# Patient Record
Sex: FEMALE | Race: WHITE | NOT HISPANIC OR LATINO | URBAN - METROPOLITAN AREA
[De-identification: names, ages, dates, MRNs, and addresses within clinical notes are randomized per-mention and may not be internally consistent; named-entity substitution may affect disease eponyms.]

---

## 2020-07-24 ENCOUNTER — OFFICE VISIT (OUTPATIENT)
Dept: URBAN - METROPOLITAN AREA TELEHEALTH 2 | Facility: TELEHEALTH | Age: 45
End: 2020-07-24
Payer: COMMERCIAL

## 2020-07-24 DIAGNOSIS — K31.84 GASTROPARESIS: ICD-10-CM

## 2020-07-24 DIAGNOSIS — Z12.11 SCREENING FOR COLON CANCER: ICD-10-CM

## 2020-07-24 DIAGNOSIS — K76.0 FATTY LIVER: ICD-10-CM

## 2020-07-24 DIAGNOSIS — R10.11 RIGHT UPPER QUADRANT ABDOMINAL PAIN: ICD-10-CM

## 2020-07-24 PROCEDURE — 1036F TOBACCO NON-USER: CPT | Performed by: NURSE PRACTITIONER

## 2020-07-24 PROCEDURE — G8417 CALC BMI ABV UP PARAM F/U: HCPCS | Performed by: NURSE PRACTITIONER

## 2020-07-24 PROCEDURE — G9903 PT SCRN TBCO ID AS NON USER: HCPCS | Performed by: NURSE PRACTITIONER

## 2020-07-24 PROCEDURE — G8427 DOCREV CUR MEDS BY ELIG CLIN: HCPCS | Performed by: NURSE PRACTITIONER

## 2020-07-24 PROCEDURE — 99213 OFFICE O/P EST LOW 20 MIN: CPT | Performed by: NURSE PRACTITIONER

## 2020-07-24 RX ORDER — PANTOPRAZOLE SODIUM 40 MG/1
TAKE 1 TABLET BY MOUTH TWICE A DAY TABLET, DELAYED RELEASE ORAL
Qty: 180 | Refills: 3 | Status: ACTIVE | COMMUNITY
Start: 2019-09-27

## 2020-07-24 RX ORDER — LABETALOL HYDROCHLORIDE 100 MG/1
1 TABLET TABLET ORAL TWICE A DAY
Status: ACTIVE | COMMUNITY

## 2020-07-24 RX ORDER — METOCLOPRAMIDE 10 MG/1
TAKE 2 TABLETS BY MOUTH AT BEDTIME TABLET ORAL
Qty: 60 | Refills: 5 | Status: ACTIVE | COMMUNITY
Start: 2020-03-02

## 2020-07-24 RX ORDER — METFORMIN HYDROCHLORIDE 500 MG/1
3 TABLETS IN AM AND 2 TABLETS AT BEDTIME TABLET, FILM COATED ORAL
Status: ACTIVE | COMMUNITY

## 2020-07-24 NOTE — HPI-OTHER HISTORIES
7/24/20 Diana presents for follow up for gastroparesis and fatty liver. She is working hard on weight loss with diet and exercise and has lost 20# since January. She is having a breakfast smoothie in the mornings and a [employer] latte shake for lunch each day. She eats a typical dinner but smaller portion around 5PM. She is walking 4 miles 5-6 days a week. She is feeling great. She continues to take pantoprazole twice daily and Reglan 10mg two tablets at bedtime but has not needed EES for a flare since January. She is on Metformin for PCOS and PCP is keeping close eye on blood sugar. Recent HA1C was 5.5 with fasting glucose of 93. She is looking forward to getting back to work next week but a little anxious as they are going back inperson with limited precautions other than social distancing when possible. 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  She had colonoscopy  2/17/20 with mild sigmoid diverticulosis, otherwise normal. Due for repeat in 10 years. TG   1/20/2020 Patient presents for follow up. Overall, she is doing well. Gastroparesis is well controlled on Reglan. She did take EES x 1 weeks between Thanksgiving and Christmas but o/w has not needed it. She rarely has a sharp pain in the RUQ pain. This comes and goes in a matter of seconds. No nausea/vomiting. She is working on weight loss and is down 8# since 12/30/2019--3# per our records since her last visit in July. Stress is much better this school year since switching to [employer] department.  She is ready to go and ahead and schedule screening colonoscopy. No f/h of colon cancer or colon polyps. She has regular bowel movements, no diarrhea or constipation. No abdominal pain. No rectal bleeding. No anemia. No prior colonoscopy. TG

## 2020-07-28 LAB
ALBUMIN: 4.7
ALKALINE PHOSPHATASE: 51
ALT (SGPT): 19
AST (SGOT): 18
BILIRUBIN, DIRECT: 0.11
BILIRUBIN, TOTAL: 0.4
PROTEIN, TOTAL: 6.9

## 2020-08-24 ENCOUNTER — ERX REFILL RESPONSE (OUTPATIENT)
Age: 45
End: 2020-08-24

## 2020-08-24 RX ORDER — METOCLOPRAMIDE 10 MG/1
TAKE 2 TABLETS BY MOUTH AT BEDTIME TABLET ORAL
Qty: 60 TABLETS | Refills: 5

## 2020-09-14 ENCOUNTER — ERX REFILL RESPONSE (OUTPATIENT)
Age: 45
End: 2020-09-14

## 2020-09-14 RX ORDER — PANTOPRAZOLE SODIUM 40 MG/1
TAKE 1 TABLET BY MOUTH TWICE A DAY TABLET, DELAYED RELEASE ORAL
Qty: 180 TABLETS | Refills: 3

## 2021-02-17 ENCOUNTER — ERX REFILL RESPONSE (OUTPATIENT)
Age: 46
End: 2021-02-17

## 2021-02-17 RX ORDER — METOCLOPRAMIDE 10 MG/1
TAKE 2 TABLETS BY MOUTH AT BEDTIME TABLET ORAL
Qty: 60 TABLETS | Refills: 5

## 2021-08-31 ENCOUNTER — TELEPHONE ENCOUNTER (OUTPATIENT)
Dept: URBAN - METROPOLITAN AREA CLINIC 92 | Facility: CLINIC | Age: 46
End: 2021-08-31

## 2021-08-31 ENCOUNTER — ERX REFILL RESPONSE (OUTPATIENT)
Dept: URBAN - NONMETROPOLITAN AREA CLINIC 2 | Facility: CLINIC | Age: 46
End: 2021-08-31

## 2021-08-31 RX ORDER — METOCLOPRAMIDE 10 MG/1
TAKE 2 TABLETS BY MOUTH AT BEDTIME 30 DAYS TABLET ORAL
Qty: 60 TABLET | Refills: 1 | OUTPATIENT

## 2021-08-31 RX ORDER — METOCLOPRAMIDE 10 MG/1
TAKE 2 TABLETS BY MOUTH AT BEDTIME TABLET ORAL
Qty: 60 TABLETS | Refills: 5 | OUTPATIENT

## 2021-09-13 ENCOUNTER — ERX REFILL RESPONSE (OUTPATIENT)
Dept: URBAN - NONMETROPOLITAN AREA CLINIC 2 | Facility: CLINIC | Age: 46
End: 2021-09-13

## 2021-09-13 RX ORDER — PANTOPRAZOLE SODIUM 40 MG/1
TAKE 1 TABLET BY MOUTH TWICE A DAY TABLET, DELAYED RELEASE ORAL
Qty: 180 TABLETS | Refills: 1 | OUTPATIENT

## 2021-09-13 RX ORDER — PANTOPRAZOLE SODIUM 40 MG/1
TAKE 1 TABLET BY MOUTH TWICE A DAY TABLET, DELAYED RELEASE ORAL
Qty: 180 TABLETS | Refills: 3 | OUTPATIENT

## 2021-09-24 ENCOUNTER — ERX REFILL RESPONSE (OUTPATIENT)
Dept: URBAN - NONMETROPOLITAN AREA CLINIC 2 | Facility: CLINIC | Age: 46
End: 2021-09-24

## 2021-09-24 RX ORDER — METOCLOPRAMIDE 10 MG/1
TAKE 2 TABLETS BY MOUTH AT BEDTIME 30 DAYS TABLET ORAL
Qty: 60 TABLET | Refills: 1 | OUTPATIENT

## 2021-10-11 ENCOUNTER — LAB OUTSIDE AN ENCOUNTER (OUTPATIENT)
Dept: URBAN - NONMETROPOLITAN AREA CLINIC 2 | Facility: CLINIC | Age: 46
End: 2021-10-11

## 2021-10-11 ENCOUNTER — OFFICE VISIT (OUTPATIENT)
Dept: URBAN - NONMETROPOLITAN AREA CLINIC 2 | Facility: CLINIC | Age: 46
End: 2021-10-11
Payer: COMMERCIAL

## 2021-10-11 ENCOUNTER — WEB ENCOUNTER (OUTPATIENT)
Dept: URBAN - NONMETROPOLITAN AREA CLINIC 2 | Facility: CLINIC | Age: 46
End: 2021-10-11

## 2021-10-11 DIAGNOSIS — Z12.11 SCREENING FOR COLON CANCER: ICD-10-CM

## 2021-10-11 DIAGNOSIS — K76.0 FATTY LIVER: ICD-10-CM

## 2021-10-11 DIAGNOSIS — R10.11 RIGHT UPPER QUADRANT ABDOMINAL PAIN: ICD-10-CM

## 2021-10-11 DIAGNOSIS — K31.84 GASTROPARESIS: ICD-10-CM

## 2021-10-11 PROCEDURE — 99214 OFFICE O/P EST MOD 30 MIN: CPT | Performed by: NURSE PRACTITIONER

## 2021-10-11 RX ORDER — PANTOPRAZOLE SODIUM 40 MG/1
TAKE 1 TABLET BY MOUTH TWICE A DAY TABLET, DELAYED RELEASE ORAL BID
Qty: 180 TABLET | Refills: 3

## 2021-10-11 RX ORDER — LABETALOL HYDROCHLORIDE 100 MG/1
1 TABLET TABLET ORAL TWICE A DAY
Status: ACTIVE | COMMUNITY

## 2021-10-11 RX ORDER — PANTOPRAZOLE SODIUM 40 MG/1
TAKE 1 TABLET BY MOUTH TWICE A DAY TABLET, DELAYED RELEASE ORAL
Qty: 180 TABLETS | Refills: 1 | Status: ACTIVE | COMMUNITY

## 2021-10-11 RX ORDER — ERYTHROMYCIN 500 MG/1
TWICE DAILY AS NEEDED TABLET, FILM COATED ORAL BID
Status: ACTIVE | COMMUNITY

## 2021-10-11 RX ORDER — METOCLOPRAMIDE 10 MG/1
TAKE 2 TABLETS BY MOUTH AT BEDTIME 30 DAYS TABLET ORAL
Qty: 60 TABLET | Refills: 1 | Status: ACTIVE | COMMUNITY

## 2021-10-11 RX ORDER — METFORMIN HYDROCHLORIDE 500 MG/1
3 TABLETS IN AM AND 2 TABLETS AT BEDTIME TABLET, FILM COATED ORAL
Status: ACTIVE | COMMUNITY

## 2021-10-11 RX ORDER — METOCLOPRAMIDE 10 MG/1
TAKE 2 TABLETS BY MOUTH AT BEDTIME TABLET ORAL ONCE DAILY
Qty: 180 TABLETS | Refills: 3

## 2021-10-11 RX ORDER — ERYTHROMYCIN 500 MG/1
TWICE DAILY AS NEEDED TABLET, FILM COATED ORAL BID
Qty: 180 TABLETS | Refills: 1

## 2021-10-11 NOTE — HPI-OTHER HISTORIES
7/24/20 Diana presents for follow up for gastroparesis and fatty liver. She is working hard on weight loss with diet and exercise and has lost 20# since January. She is having a breakfast smoothie in the mornings and a [employer] latte shake for lunch each day. She eats a typical dinner but smaller portion around 5PM. She is walking 4 miles 5-6 days a week. She is feeling great. She continues to take pantoprazole twice daily and Reglan 10mg two tablets at bedtime but has not needed EES for a flare since January. She is on Metformin for PCOS and PCP is keeping close eye on blood sugar. Recent HA1C was 5.5 with fasting glucose of 93. She is looking forward to getting back to work next week but a little anxious as they are going back inperson with limited precautions other than social distancing when possible. 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  She had colonoscopy  2/17/20 with mild sigmoid diverticulosis, otherwise normal. Due for repeat in 10 years. TG   1/20/2020 Patient presents for follow up. Overall, she is doing well. Gastroparesis is well controlled on Reglan. She did take EES x 1 weeks between Thanksgiving and Christmas but o/w has not needed it. She rarely has a sharp pain in the RUQ pain. This comes and goes in a matter of seconds. No nausea/vomiting. She is working on weight loss and is down 8# since 12/30/2019--3# per our records since her last visit in July. Stress is much better this school year since switching to [employer] department.  She is ready to go and ahead and schedule screening colonoscopy. No f/h of colon cancer or colon polyps. She has regular bowel movements, no diarrhea or constipation. No abdominal pain. No rectal bleeding. No anemia. No prior colonoscopy. TG  10/11/21 Diana presents for follow up for gastroparesis. She needs refills on Reglan 10mg two at bedtime, pantoprazole 40mg bid, and EES 500mg bid that she uses only during a flare. She ran out of pantoprazole about a week ago and is having daily heartburn. She is doing well on a diet of liquid meals twice daily with one regular meal in the early evening. She is currently struggling with nausea/flare after eating apple chips and laying down a few days ago. Her weight is stable.  She has had a difficult year and with COVID November 2020 and lost 20# with this. She was admitted for a week with pneumonia and was very slow to get back to baseline. Her mother, Dary Freire, also passed away earlier this year after having CHF following COVID.  TG

## 2021-10-12 LAB
A/G RATIO: 2.4
ALBUMIN: 5
ALKALINE PHOSPHATASE: 56
ALT (SGPT): 21
AST (SGOT): 25
BILIRUBIN, TOTAL: 0.5
BUN/CREATININE RATIO: 22
BUN: 19
CALCIUM: 9.7
CARBON DIOXIDE, TOTAL: 25
CHLORIDE: 98
CREATININE: 0.86
EGFR IF AFRICN AM: 94
EGFR IF NONAFRICN AM: 81
GLOBULIN, TOTAL: 2.1
GLUCOSE: 82
HEMATOCRIT: 39.5
HEMOGLOBIN: 12.8
INR: 1
MCH: 27.4
MCHC: 32.4
MCV: 84
NRBC: (no result)
PLATELETS: 229
POTASSIUM: 4.6
PROTEIN, TOTAL: 7.1
PROTHROMBIN TIME: 10.4
RBC: 4.68
RDW: 14.4
SODIUM: 138
WBC: 7.2

## 2022-04-11 ENCOUNTER — OFFICE VISIT (OUTPATIENT)
Dept: URBAN - NONMETROPOLITAN AREA CLINIC 13 | Facility: CLINIC | Age: 47
End: 2022-04-11
Payer: COMMERCIAL

## 2022-04-11 VITALS
DIASTOLIC BLOOD PRESSURE: 83 MMHG | WEIGHT: 273.2 LBS | HEART RATE: 75 BPM | HEIGHT: 64 IN | SYSTOLIC BLOOD PRESSURE: 127 MMHG | BODY MASS INDEX: 46.64 KG/M2 | TEMPERATURE: 97.9 F

## 2022-04-11 DIAGNOSIS — K76.0 FATTY LIVER: ICD-10-CM

## 2022-04-11 DIAGNOSIS — R10.11 RIGHT UPPER QUADRANT ABDOMINAL PAIN: ICD-10-CM

## 2022-04-11 DIAGNOSIS — K31.84 GASTROPARESIS: ICD-10-CM

## 2022-04-11 DIAGNOSIS — Z12.11 SCREENING FOR COLON CANCER: ICD-10-CM

## 2022-04-11 PROCEDURE — 99214 OFFICE O/P EST MOD 30 MIN: CPT | Performed by: NURSE PRACTITIONER

## 2022-04-11 RX ORDER — ERYTHROMYCIN 500 MG/1
TWICE DAILY AS NEEDED TABLET, FILM COATED ORAL BID
Qty: 180 TABLETS | Refills: 3

## 2022-04-11 RX ORDER — LABETALOL HYDROCHLORIDE 100 MG/1
1 TABLET TABLET ORAL TWICE A DAY
Status: ACTIVE | COMMUNITY

## 2022-04-11 RX ORDER — PANTOPRAZOLE SODIUM 40 MG/1
TAKE 1 TABLET BY MOUTH TWICE A DAY TABLET, DELAYED RELEASE ORAL BID
Qty: 180 TABLET | Refills: 3 | Status: ACTIVE | COMMUNITY

## 2022-04-11 RX ORDER — ERYTHROMYCIN 500 MG/1
TWICE DAILY AS NEEDED TABLET, FILM COATED ORAL BID
Qty: 180 TABLETS | Refills: 1 | Status: ACTIVE | COMMUNITY

## 2022-04-11 RX ORDER — PANTOPRAZOLE SODIUM 40 MG/1
TAKE 1 TABLET BY MOUTH TWICE A DAY TABLET, DELAYED RELEASE ORAL BID
Qty: 180 TABLET | Refills: 3

## 2022-04-11 RX ORDER — METOCLOPRAMIDE 10 MG/1
TAKE 2 TABLETS BY MOUTH AT BEDTIME TABLET ORAL ONCE DAILY
Qty: 180 TABLETS | Refills: 3

## 2022-04-11 RX ORDER — METFORMIN HYDROCHLORIDE 500 MG/1
3 TABLETS IN AM AND 2 TABLETS AT BEDTIME TABLET, FILM COATED ORAL
Status: ACTIVE | COMMUNITY

## 2022-04-11 RX ORDER — METOCLOPRAMIDE 10 MG/1
TAKE 2 TABLETS BY MOUTH AT BEDTIME TABLET ORAL ONCE DAILY
Qty: 180 TABLETS | Refills: 3 | Status: ACTIVE | COMMUNITY

## 2022-04-11 NOTE — HPI-OTHER HISTORIES
7/24/20 Diana presents for follow up for gastroparesis and fatty liver. She is working hard on weight loss with diet and exercise and has lost 20# since January. She is having a breakfast smoothie in the mornings and a [employer] latte shake for lunch each day. She eats a typical dinner but smaller portion around 5PM. She is walking 4 miles 5-6 days a week. She is feeling great. She continues to take pantoprazole twice daily and Reglan 10mg two tablets at bedtime but has not needed EES for a flare since January. She is on Metformin for PCOS and PCP is keeping close eye on blood sugar. Recent HA1C was 5.5 with fasting glucose of 93. She is looking forward to getting back to work next week but a little anxious as they are going back inperson with limited precautions other than social distancing when possible. 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  She had colonoscopy  2/17/20 with mild sigmoid diverticulosis, otherwise normal. Due for repeat in 10 years. TG   1/20/2020 Patient presents for follow up. Overall, she is doing well. Gastroparesis is well controlled on Reglan. She did take EES x 1 weeks between Thanksgiving and Christmas but o/w has not needed it. She rarely has a sharp pain in the RUQ pain. This comes and goes in a matter of seconds. No nausea/vomiting. She is working on weight loss and is down 8# since 12/30/2019--3# per our records since her last visit in July. Stress is much better this school year since switching to [employer] department.  She is ready to go and ahead and schedule screening colonoscopy. No f/h of colon cancer or colon polyps. She has regular bowel movements, no diarrhea or constipation. No abdominal pain. No rectal bleeding. No anemia. No prior colonoscopy. TG  10/11/21 Diana presents for follow up for gastroparesis. She needs refills on Reglan 10mg two at bedtime, pantoprazole 40mg bid, and EES 500mg bid that she uses only during a flare. She ran out of pantoprazole about a week ago and is having daily heartburn. She is doing well on a diet of liquid meals twice daily with one regular meal in the early evening. She is currently struggling with nausea/flare after eating apple chips and laying down a few days ago. Her weight is stable.  She has had a difficult year and with COVID November 2020 and lost 20# with this. She was admitted for a week with pneumonia and was very slow to get back to baseline. Her mother, Dary Freire, also passed away earlier this year after having CHF following COVID.  TG  4/11/22 Diana presents for GP follow up. She continues to struggle with exacerbation about once in two months. She takes EES during flares and pantoprazole bid and reglan 20mg at bedtime scheduled. She cannot tolerate reglan during the day due to drowsiness. She has not been on EES scheduled throughout the day as she tries to limit medications. She is to the point of only tolerating one normal meal daily. She has liquids for breakfast and dinner. If she tries to eat solids twice daily she has a flare. She has gained about 10# since her last visit. She attributes this to stress eating, typically snacks like chocolate.  She has hx of fatty liver. US 1/2021 was normal with normal echotexture and LFTs were normal. She continues on vitamin E. TG

## 2022-10-03 ENCOUNTER — OFFICE VISIT (OUTPATIENT)
Dept: URBAN - NONMETROPOLITAN AREA CLINIC 13 | Facility: CLINIC | Age: 47
End: 2022-10-03
Payer: COMMERCIAL

## 2022-10-03 VITALS
SYSTOLIC BLOOD PRESSURE: 124 MMHG | WEIGHT: 267 LBS | HEIGHT: 64 IN | HEART RATE: 71 BPM | DIASTOLIC BLOOD PRESSURE: 85 MMHG | BODY MASS INDEX: 45.58 KG/M2

## 2022-10-03 DIAGNOSIS — Z12.11 SCREENING FOR COLON CANCER: ICD-10-CM

## 2022-10-03 DIAGNOSIS — R10.11 RIGHT UPPER QUADRANT ABDOMINAL PAIN: ICD-10-CM

## 2022-10-03 DIAGNOSIS — K31.84 GASTROPARESIS: ICD-10-CM

## 2022-10-03 DIAGNOSIS — K76.0 FATTY LIVER: ICD-10-CM

## 2022-10-03 PROCEDURE — 99213 OFFICE O/P EST LOW 20 MIN: CPT | Performed by: NURSE PRACTITIONER

## 2022-10-03 RX ORDER — PANTOPRAZOLE SODIUM 40 MG/1
TAKE 1 TABLET BY MOUTH TWICE A DAY TABLET, DELAYED RELEASE ORAL BID
Qty: 180 TABLET | Refills: 3

## 2022-10-03 RX ORDER — ONDANSETRON 4 MG/1
1 TABLET ON THE TONGUE AND ALLOW TO DISSOLVE TABLET, ORALLY DISINTEGRATING ORAL
Qty: 30 TABLET | Refills: 0 | OUTPATIENT
Start: 2022-10-03

## 2022-10-03 RX ORDER — PANTOPRAZOLE SODIUM 40 MG/1
TAKE 1 TABLET BY MOUTH TWICE A DAY TABLET, DELAYED RELEASE ORAL BID
Qty: 180 TABLET | Refills: 3 | Status: ACTIVE | COMMUNITY

## 2022-10-03 RX ORDER — METFORMIN HYDROCHLORIDE 500 MG/1
3 TABLETS IN AM AND 2 TABLETS AT BEDTIME TABLET, FILM COATED ORAL
Status: ACTIVE | COMMUNITY

## 2022-10-03 RX ORDER — ERYTHROMYCIN 500 MG/1
TWICE DAILY AS NEEDED TABLET, FILM COATED ORAL
Qty: 270 | Refills: 3

## 2022-10-03 RX ORDER — ERYTHROMYCIN 500 MG/1
TWICE DAILY AS NEEDED TABLET, FILM COATED ORAL BID
Qty: 180 TABLETS | Refills: 3 | Status: ACTIVE | COMMUNITY

## 2022-10-03 RX ORDER — METOCLOPRAMIDE 10 MG/1
TAKE 2 TABLETS BY MOUTH AT BEDTIME TABLET ORAL ONCE DAILY
Qty: 180 TABLETS | Refills: 3

## 2022-10-03 RX ORDER — LABETALOL HYDROCHLORIDE 100 MG/1
1 TABLET TABLET ORAL TWICE A DAY
Status: ACTIVE | COMMUNITY

## 2022-10-03 RX ORDER — METOCLOPRAMIDE 10 MG/1
TAKE 2 TABLETS BY MOUTH AT BEDTIME TABLET ORAL ONCE DAILY
Qty: 180 TABLETS | Refills: 3 | Status: ACTIVE | COMMUNITY

## 2022-10-03 NOTE — HPI-TODAY'S VISIT:
Patient is a 47-year-old female previously followed by Pearl Leal who returns for follow-up of gastroparesis.  She is currently doing okay.  She knows that if she eats too much, she will have a flare.  She is normally mindful of this and tries to supplement a meal or 2 a day with a protein shake or some sort of liquid supplement instead.  Reglan is helpful, but it makes her too fatigued.  She takes erythromycin twice daily.  No additional complaints.  Otherwise, she is doing well. Sb

## 2022-10-24 ENCOUNTER — TELEPHONE ENCOUNTER (OUTPATIENT)
Dept: URBAN - METROPOLITAN AREA CLINIC 92 | Facility: CLINIC | Age: 47
End: 2022-10-24

## 2022-10-24 RX ORDER — ERYTHROMYCIN 500 MG/1
FOUR TIMES DAILY AS NEEDED TABLET, FILM COATED ORAL
Qty: 360 | Refills: 3

## 2023-04-12 ENCOUNTER — OFFICE VISIT (OUTPATIENT)
Dept: URBAN - NONMETROPOLITAN AREA CLINIC 2 | Facility: CLINIC | Age: 48
End: 2023-04-12
Payer: COMMERCIAL

## 2023-04-12 VITALS
SYSTOLIC BLOOD PRESSURE: 125 MMHG | TEMPERATURE: 97.1 F | DIASTOLIC BLOOD PRESSURE: 89 MMHG | HEART RATE: 69 BPM | WEIGHT: 221 LBS | HEIGHT: 64 IN | BODY MASS INDEX: 37.73 KG/M2

## 2023-04-12 DIAGNOSIS — K31.84 GASTROPARESIS: ICD-10-CM

## 2023-04-12 PROCEDURE — 99213 OFFICE O/P EST LOW 20 MIN: CPT | Performed by: INTERNAL MEDICINE

## 2023-04-12 RX ORDER — ERYTHROMYCIN 500 MG/1
FOUR TIMES DAILY AS NEEDED TABLET, FILM COATED ORAL
Qty: 360 | Refills: 3

## 2023-04-12 RX ORDER — METOCLOPRAMIDE 10 MG/1
TAKE 2 TABLETS BY MOUTH AT BEDTIME TABLET ORAL ONCE DAILY
Qty: 180 TABLETS | Refills: 3 | Status: ACTIVE | COMMUNITY

## 2023-04-12 RX ORDER — PANTOPRAZOLE SODIUM 40 MG/1
TAKE 1 TABLET BY MOUTH TWICE A DAY TABLET, DELAYED RELEASE ORAL BID
Qty: 180 TABLET | Refills: 3 | Status: ACTIVE | COMMUNITY

## 2023-04-12 RX ORDER — METFORMIN HYDROCHLORIDE 500 MG/1
3 TABLETS IN AM AND 2 TABLETS AT BEDTIME TABLET, FILM COATED ORAL
Status: ACTIVE | COMMUNITY

## 2023-04-12 RX ORDER — LABETALOL HYDROCHLORIDE 100 MG/1
1 TABLET TABLET ORAL TWICE A DAY
Status: ACTIVE | COMMUNITY

## 2023-04-12 RX ORDER — ERYTHROMYCIN 500 MG/1
FOUR TIMES DAILY AS NEEDED TABLET, FILM COATED ORAL
Qty: 360 | Refills: 3 | Status: ACTIVE | COMMUNITY

## 2023-04-12 RX ORDER — ONDANSETRON 4 MG/1
1 TABLET ON THE TONGUE AND ALLOW TO DISSOLVE TABLET, ORALLY DISINTEGRATING ORAL
Qty: 30 TABLET | Refills: 0 | Status: DISCONTINUED | COMMUNITY
Start: 2022-10-03

## 2023-04-12 RX ORDER — LEVOTHYROXINE SODIUM 50 UG/1
TAKE 1 TABLET BY MOUTH EVERY DAY IN THE MORNING ON EMPTY STOMACH FOR 90 DAYS TABLET ORAL
Qty: 90 EACH | Refills: 1 | Status: ACTIVE | COMMUNITY

## 2023-04-12 NOTE — HPI-TODAY'S VISIT:
4/12/23: Ms. Brown returns for follow-up of chronic gastroparesis.  Since her last clinic visit, she has been doing well.  She is on pantoprazole 40 mg twice daily, Reglan 20 mg nightly,  mg twice daily, and Miralax daily.  She is feeling well and moving her bowels most days.  We discussed Motegrity.  She feels that EES has been very helpful to her.  10/3/22: Patient is a 47-year-old female previously followed by Pearl Leal who returns for follow-up of gastroparesis.  She is currently doing okay.  She knows that if she eats too much, she will have a flare.  She is normally mindful of this and tries to supplement a meal or 2 a day with a protein shake or some sort of liquid supplement instead.  Reglan is helpful, but it makes her too fatigued.  She takes erythromycin twice daily.  No additional complaints.  Otherwise, she is doing well. Sb

## 2023-04-14 ENCOUNTER — TELEPHONE ENCOUNTER (OUTPATIENT)
Dept: URBAN - NONMETROPOLITAN AREA CLINIC 2 | Facility: CLINIC | Age: 48
End: 2023-04-14

## 2023-04-14 RX ORDER — METFORMIN HYDROCHLORIDE 500 MG/1
3 TABLETS IN AM AND 2 TABLETS AT BEDTIME TABLET, FILM COATED ORAL
Status: ACTIVE | COMMUNITY

## 2023-04-14 RX ORDER — METOCLOPRAMIDE 10 MG/1
TAKE 2 TABLETS BY MOUTH AT BEDTIME TABLET ORAL ONCE DAILY
Qty: 180 TABLETS | Refills: 3 | Status: ACTIVE | COMMUNITY

## 2023-04-14 RX ORDER — ERYTHROMYCIN 500 MG/1
FOUR TIMES DAILY AS NEEDED TABLET, FILM COATED ORAL
Qty: 360 | Refills: 3 | Status: ACTIVE | COMMUNITY

## 2023-04-14 RX ORDER — LABETALOL HYDROCHLORIDE 100 MG/1
1 TABLET TABLET ORAL TWICE A DAY
Status: ACTIVE | COMMUNITY

## 2023-04-14 RX ORDER — PRUCALOPRIDE 2 MG/1
1 TABLET TABLET, FILM COATED ORAL ONCE A DAY
Qty: 90 TABLET | Refills: 3 | OUTPATIENT
Start: 2023-04-17 | End: 2024-04-11

## 2023-04-14 RX ORDER — PANTOPRAZOLE SODIUM 40 MG/1
TAKE 1 TABLET BY MOUTH TWICE A DAY TABLET, DELAYED RELEASE ORAL BID
Qty: 180 TABLET | Refills: 3 | Status: ACTIVE | COMMUNITY

## 2023-04-14 RX ORDER — LINACLOTIDE 145 UG/1
1 CAPSULE AT LEAST 30 MINUTES BEFORE THE FIRST MEAL OF THE DAY ON AN EMPTY STOMACH CAPSULE, GELATIN COATED ORAL ONCE A DAY
Qty: 30 | Refills: 11 | OUTPATIENT
Start: 2023-04-18 | End: 2024-04-12

## 2023-04-14 RX ORDER — LEVOTHYROXINE SODIUM 50 UG/1
TAKE 1 TABLET BY MOUTH EVERY DAY IN THE MORNING ON EMPTY STOMACH FOR 90 DAYS TABLET ORAL
Qty: 90 EACH | Refills: 1 | Status: ACTIVE | COMMUNITY

## 2023-04-17 PROBLEM — 82934008: Status: ACTIVE | Noted: 2023-04-17

## 2023-09-21 ENCOUNTER — TELEPHONE ENCOUNTER (OUTPATIENT)
Dept: URBAN - NONMETROPOLITAN AREA CLINIC 2 | Facility: CLINIC | Age: 48
End: 2023-09-21

## 2023-09-21 RX ORDER — METOCLOPRAMIDE 10 MG/1
TAKE 2 TABLETS BY MOUTH AT BEDTIME TABLET ORAL ONCE DAILY
Qty: 180 TABLETS | Refills: 3

## 2023-10-11 ENCOUNTER — OFFICE VISIT (OUTPATIENT)
Dept: URBAN - NONMETROPOLITAN AREA CLINIC 2 | Facility: CLINIC | Age: 48
End: 2023-10-11
Payer: COMMERCIAL

## 2023-10-11 VITALS
BODY MASS INDEX: 34.49 KG/M2 | DIASTOLIC BLOOD PRESSURE: 70 MMHG | HEART RATE: 84 BPM | WEIGHT: 202 LBS | SYSTOLIC BLOOD PRESSURE: 105 MMHG | HEIGHT: 64 IN

## 2023-10-11 DIAGNOSIS — K31.84 GASTROPARESIS: ICD-10-CM

## 2023-10-11 PROCEDURE — 99213 OFFICE O/P EST LOW 20 MIN: CPT | Performed by: INTERNAL MEDICINE

## 2023-10-11 RX ORDER — ERYTHROMYCIN 500 MG/1
FOUR TIMES DAILY AS NEEDED TABLET, FILM COATED ORAL
Qty: 360 | Refills: 3 | Status: ACTIVE | COMMUNITY

## 2023-10-11 RX ORDER — LEVOTHYROXINE SODIUM 50 UG/1
TAKE 1 TABLET BY MOUTH EVERY DAY IN THE MORNING ON EMPTY STOMACH FOR 90 DAYS TABLET ORAL
Qty: 90 EACH | Refills: 1 | Status: ACTIVE | COMMUNITY

## 2023-10-11 RX ORDER — LABETALOL HYDROCHLORIDE 100 MG/1
1 TABLET TABLET ORAL TWICE A DAY
Status: ACTIVE | COMMUNITY

## 2023-10-11 RX ORDER — METOCLOPRAMIDE 10 MG/1
TAKE 2 TABLETS BY MOUTH AT BEDTIME TABLET ORAL ONCE DAILY
Qty: 180 TABLETS | Refills: 3 | Status: ACTIVE | COMMUNITY

## 2023-10-11 RX ORDER — PRUCALOPRIDE 2 MG/1
1 TABLET TABLET, FILM COATED ORAL ONCE A DAY
Qty: 90 TABLET | Refills: 3 | Status: ACTIVE | COMMUNITY
Start: 2023-04-17 | End: 2024-04-11

## 2023-10-11 RX ORDER — LINACLOTIDE 145 UG/1
1 CAPSULE AT LEAST 30 MINUTES BEFORE THE FIRST MEAL OF THE DAY ON AN EMPTY STOMACH CAPSULE, GELATIN COATED ORAL ONCE A DAY
Qty: 30 | Refills: 11 | Status: ACTIVE | COMMUNITY
Start: 2023-04-18 | End: 2024-04-12

## 2023-10-11 RX ORDER — PANTOPRAZOLE SODIUM 40 MG/1
TAKE 1 TABLET BY MOUTH TWICE A DAY TABLET, DELAYED RELEASE ORAL BID
Qty: 180 TABLET | Refills: 3 | Status: ACTIVE | COMMUNITY

## 2023-10-11 RX ORDER — METFORMIN HYDROCHLORIDE 500 MG/1
3 TABLETS IN AM AND 2 TABLETS AT BEDTIME TABLET, FILM COATED ORAL
Status: ACTIVE | COMMUNITY

## 2023-10-11 RX ORDER — ERYTHROMYCIN 500 MG/1
FOUR TIMES DAILY AS NEEDED TABLET, FILM COATED ORAL
Qty: 360 | Refills: 3

## 2023-10-11 NOTE — HPI-TODAY'S VISIT:
10/11/23: Ms. Brown returns for follow-up of chronic gastroparesis. Since her last clinic visit, she has been doing well. She is on pantoprazole 40 mg twice daily, Reglan 20 mg nightly,  mg twice daily, and Miralax daily.  Motegrity was discussed and required her to fail Linzess which was sent in for her.    4/12/23: Ms. Brown returns for follow-up of chronic gastroparesis.  Since her last clinic visit, she has been doing well.  She is on pantoprazole 40 mg twice daily, Reglan 20 mg nightly,  mg twice daily, and Miralax daily.  She is feeling well and moving her bowels most days.  We discussed Motegrity.  She feels that EES has been very helpful to her.  10/3/22: Patient is a 47-year-old female previously followed by Pearl Leal who returns for follow-up of gastroparesis.  She is currently doing okay.  She knows that if she eats too much, she will have a flare.  She is normally mindful of this and tries to supplement a meal or 2 a day with a protein shake or some sort of liquid supplement instead.  Reglan is helpful, but it makes her too fatigued.  She takes erythromycin twice daily.  No additional complaints.  Otherwise, she is doing well. Sb

## 2023-10-17 ENCOUNTER — TELEPHONE ENCOUNTER (OUTPATIENT)
Dept: URBAN - NONMETROPOLITAN AREA CLINIC 2 | Facility: CLINIC | Age: 48
End: 2023-10-17

## 2023-10-17 RX ORDER — PANTOPRAZOLE SODIUM 40 MG/1
TAKE 1 TABLET BY MOUTH TWICE A DAY TABLET, DELAYED RELEASE ORAL TWICE DAILY
Qty: 180 | Refills: 3

## 2023-11-27 ENCOUNTER — ERX REFILL RESPONSE (OUTPATIENT)
Dept: URBAN - NONMETROPOLITAN AREA CLINIC 2 | Facility: CLINIC | Age: 48
End: 2023-11-27

## 2023-11-27 RX ORDER — ERYTHROMYCIN 500 MG/1
FOUR TIMES DAILY AS NEEDED TABLET, FILM COATED ORAL
Qty: 360 | Refills: 3 | OUTPATIENT

## 2023-11-27 RX ORDER — ERYTHROMYCIN 500 MG/1
TAKE FOUR TIMES DAILY PRN NAUSEA TABLET, DELAYED RELEASE ORAL
Qty: 120 | Refills: 11 | OUTPATIENT

## 2024-04-11 ENCOUNTER — LAB (OUTPATIENT)
Dept: URBAN - NONMETROPOLITAN AREA CLINIC 2 | Facility: CLINIC | Age: 49
End: 2024-04-11

## 2024-04-11 ENCOUNTER — OV EP (OUTPATIENT)
Dept: URBAN - NONMETROPOLITAN AREA CLINIC 2 | Facility: CLINIC | Age: 49
End: 2024-04-11
Payer: COMMERCIAL

## 2024-04-11 VITALS
HEIGHT: 64 IN | DIASTOLIC BLOOD PRESSURE: 75 MMHG | SYSTOLIC BLOOD PRESSURE: 110 MMHG | WEIGHT: 194 LBS | TEMPERATURE: 98 F | HEART RATE: 76 BPM | BODY MASS INDEX: 33.12 KG/M2

## 2024-04-11 DIAGNOSIS — K31.84 GASTROPARESIS: ICD-10-CM

## 2024-04-11 PROCEDURE — 99214 OFFICE O/P EST MOD 30 MIN: CPT | Performed by: NURSE PRACTITIONER

## 2024-04-11 RX ORDER — LABETALOL HYDROCHLORIDE 100 MG/1
1 TABLET TABLET ORAL TWICE A DAY
Status: ACTIVE | COMMUNITY

## 2024-04-11 RX ORDER — LEVOTHYROXINE SODIUM 50 UG/1
TAKE 1 TABLET BY MOUTH EVERY DAY IN THE MORNING ON EMPTY STOMACH FOR 90 DAYS TABLET ORAL
Qty: 90 EACH | Refills: 1 | Status: ACTIVE | COMMUNITY

## 2024-04-11 RX ORDER — PRUCALOPRIDE 2 MG/1
1 TABLET TABLET, FILM COATED ORAL ONCE A DAY
Qty: 90 TABLET | Refills: 3 | Status: ACTIVE | COMMUNITY
Start: 2023-04-17 | End: 2024-04-11

## 2024-04-11 RX ORDER — METOCLOPRAMIDE 10 MG/1
TAKE 2 TABLETS BY MOUTH AT BEDTIME TABLET ORAL ONCE DAILY
Qty: 180 TABLETS | Refills: 3 | Status: ACTIVE | COMMUNITY

## 2024-04-11 RX ORDER — LINACLOTIDE 145 UG/1
1 CAPSULE AT LEAST 30 MINUTES BEFORE THE FIRST MEAL OF THE DAY ON AN EMPTY STOMACH CAPSULE, GELATIN COATED ORAL ONCE A DAY
Qty: 30 | Refills: 11 | Status: ACTIVE | COMMUNITY
Start: 2023-04-18 | End: 2024-04-12

## 2024-04-11 RX ORDER — ERYTHROMYCIN 500 MG/1
TAKE FOUR TIMES DAILY PRN NAUSEA TABLET, DELAYED RELEASE ORAL
Qty: 120 | Refills: 11 | Status: ACTIVE | COMMUNITY

## 2024-04-11 RX ORDER — METFORMIN HYDROCHLORIDE 500 MG/1
3 TABLETS IN AM AND 2 TABLETS AT BEDTIME TABLET, FILM COATED ORAL
Status: ACTIVE | COMMUNITY

## 2024-04-11 RX ORDER — PANTOPRAZOLE SODIUM 40 MG/1
TAKE 1 TABLET BY MOUTH TWICE A DAY TABLET, DELAYED RELEASE ORAL TWICE DAILY
Qty: 180 | Refills: 3 | Status: ACTIVE | COMMUNITY

## 2024-04-11 RX ORDER — ERYTHROMYCIN 500 MG/1
FOUR TIMES DAILY AS NEEDED TABLET, FILM COATED ORAL
Qty: 360 | Refills: 3

## 2024-04-11 NOTE — HPI-TODAY'S VISIT:
4/11/24 Ms. Brown returns for follow-up of chronic gastroparesis. Since her last clinic visit, she has been doing well. She is on pantoprazole 40 mg twice daily, Reglan 20 mg nightly,  mg twice daily. did have a flare x1. resolved. may have had sinus infection at time. on wegovy now for weight loss. sb  4/12/23: Ms. Brown returns for follow-up of chronic gastroparesis.  Since her last clinic visit, she has been doing well.  She is on pantoprazole 40 mg twice daily, Reglan 20 mg nightly,  mg twice daily, and Miralax daily.  She is feeling well and moving her bowels most days.  We discussed Motegrity.  She feels that EES has been very helpful to her.  10/3/22: Patient is a 47-year-old female previously followed by Pearl Leal who returns for follow-up of gastroparesis.  She is currently doing okay.  She knows that if she eats too much, she will have a flare.  She is normally mindful of this and tries to supplement a meal or 2 a day with a protein shake or some sort of liquid supplement instead.  Reglan is helpful, but it makes her too fatigued.  She takes erythromycin twice daily.  No additional complaints.  Otherwise, she is doing well. Sb

## 2024-10-03 ENCOUNTER — LAB OUTSIDE AN ENCOUNTER (OUTPATIENT)
Dept: URBAN - NONMETROPOLITAN AREA CLINIC 2 | Facility: CLINIC | Age: 49
End: 2024-10-03

## 2024-10-03 ENCOUNTER — DASHBOARD ENCOUNTERS (OUTPATIENT)
Age: 49
End: 2024-10-03

## 2024-10-03 ENCOUNTER — OFFICE VISIT (OUTPATIENT)
Dept: URBAN - NONMETROPOLITAN AREA CLINIC 2 | Facility: CLINIC | Age: 49
End: 2024-10-03
Payer: COMMERCIAL

## 2024-10-03 VITALS
HEART RATE: 74 BPM | WEIGHT: 195 LBS | TEMPERATURE: 97.9 F | DIASTOLIC BLOOD PRESSURE: 78 MMHG | SYSTOLIC BLOOD PRESSURE: 115 MMHG | HEIGHT: 64 IN | BODY MASS INDEX: 33.29 KG/M2

## 2024-10-03 DIAGNOSIS — K31.84 GASTROPARESIS: ICD-10-CM

## 2024-10-03 DIAGNOSIS — Z12.11 COLON CANCER SCREENING: ICD-10-CM

## 2024-10-03 DIAGNOSIS — K59.04 CHRONIC IDIOPATHIC CONSTIPATION: ICD-10-CM

## 2024-10-03 PROCEDURE — 99214 OFFICE O/P EST MOD 30 MIN: CPT | Performed by: NURSE PRACTITIONER

## 2024-10-03 RX ORDER — TIRZEPATIDE 7.5 MG/.5ML
INJECTION, SOLUTION SUBCUTANEOUS
Qty: 2 MILLILITER | Status: ACTIVE | COMMUNITY

## 2024-10-03 RX ORDER — LINACLOTIDE 145 UG/1
1 CAPSULE AT LEAST 30 MINUTES BEFORE THE FIRST MEAL OF THE DAY ON AN EMPTY STOMACH CAPSULE, GELATIN COATED ORAL ONCE A DAY
Qty: 90 | Refills: 3 | OUTPATIENT
Start: 2024-10-03 | End: 2025-09-28

## 2024-10-03 RX ORDER — PANTOPRAZOLE SODIUM 40 MG/1
TAKE 1 TABLET BY MOUTH TWICE A DAY TABLET, DELAYED RELEASE ORAL TWICE DAILY
Qty: 180 | Refills: 3 | Status: ACTIVE | COMMUNITY

## 2024-10-03 RX ORDER — METFORMIN HYDROCHLORIDE 500 MG/1
3 TABLETS IN AM AND 2 TABLETS AT BEDTIME TABLET, FILM COATED ORAL
Status: ACTIVE | COMMUNITY

## 2024-10-03 RX ORDER — ERYTHROMYCIN 500 MG/1
TAKE FOUR TIMES DAILY PRN NAUSEA TABLET, DELAYED RELEASE ORAL
Qty: 120 | Refills: 11 | Status: ACTIVE | COMMUNITY

## 2024-10-03 RX ORDER — METOCLOPRAMIDE 10 MG/1
TAKE 2 TABLETS BY MOUTH AT BEDTIME TABLET ORAL ONCE DAILY
Qty: 180 TABLETS | Refills: 3 | Status: ACTIVE | COMMUNITY

## 2024-10-03 RX ORDER — PANTOPRAZOLE SODIUM 40 MG/1
TAKE 1 TABLET BY MOUTH TWICE A DAY TABLET, DELAYED RELEASE ORAL TWICE DAILY
Qty: 180 | Refills: 3

## 2024-10-03 RX ORDER — ERYTHROMYCIN 500 MG/1
FOUR TIMES DAILY AS NEEDED TABLET, FILM COATED ORAL
Qty: 360 | Refills: 3 | Status: ACTIVE | COMMUNITY

## 2024-10-03 RX ORDER — LABETALOL HYDROCHLORIDE 100 MG/1
1 TABLET TABLET ORAL TWICE A DAY
Status: ACTIVE | COMMUNITY

## 2024-10-03 RX ORDER — ERYTHROMYCIN 500 MG/1
FOUR TIMES DAILY AS NEEDED TABLET, FILM COATED ORAL
Qty: 360 | Refills: 3

## 2024-10-03 RX ORDER — LEVOTHYROXINE SODIUM 50 UG/1
TAKE 1 TABLET BY MOUTH EVERY DAY IN THE MORNING ON EMPTY STOMACH FOR 90 DAYS TABLET ORAL
Qty: 90 EACH | Refills: 1 | Status: ACTIVE | COMMUNITY

## 2024-10-03 RX ORDER — METOCLOPRAMIDE 10 MG/1
TAKE 2 TABLETS BY MOUTH AT BEDTIME TABLET ORAL ONCE DAILY
Qty: 180 TABLETS | Refills: 3

## 2024-10-03 RX ORDER — LINACLOTIDE 145 UG/1
TAKE 1 CAPSULE BY MOUTH EVERY DAY 30 MINUTES BEFORE THE FIRST MEAL OF THE DAY ON EMPTY STOMACH CAPSULE, GELATIN COATED ORAL
Qty: 30 CAPSULE | Refills: 11 | Status: ACTIVE | COMMUNITY

## 2024-10-03 NOTE — HPI-TODAY'S VISIT:
10/3/24 Ms. Brown returns for follow-up of chronic gastroparesis. Since her last clinic visit, she has continue EES, linzess, and pantoprazole. still has occasional flare. she is on zepound for PCOS and weightloss. she is actually somewhat tolerating this despite GP. she is interested in being looked at for GPOEM. will get update GES since last was 2013, but aware she will have to hold GLP for test for 2 weeks. she flares is she missed a dose of her medications. sb  4/12/23: Ms. Brown returns for follow-up of chronic gastroparesis.  Since her last clinic visit, she has been doing well.  She is on pantoprazole 40 mg twice daily, Reglan 20 mg nightly,  mg twice daily, and Miralax daily.  She is feeling well and moving her bowels most days.  We discussed Motegrity.  She feels that EES has been very helpful to her.  10/3/22: Patient is a 47-year-old female previously followed by Pearl Leal who returns for follow-up of gastroparesis.  She is currently doing okay.  She knows that if she eats too much, she will have a flare.  She is normally mindful of this and tries to supplement a meal or 2 a day with a protein shake or some sort of liquid supplement instead.  Reglan is helpful, but it makes her too fatigued.  She takes erythromycin twice daily.  No additional complaints.  Otherwise, she is doing well. Sb

## 2024-10-15 ENCOUNTER — TELEPHONE ENCOUNTER (OUTPATIENT)
Dept: URBAN - NONMETROPOLITAN AREA CLINIC 2 | Facility: CLINIC | Age: 49
End: 2024-10-15